# Patient Record
Sex: FEMALE | Race: AMERICAN INDIAN OR ALASKA NATIVE
[De-identification: names, ages, dates, MRNs, and addresses within clinical notes are randomized per-mention and may not be internally consistent; named-entity substitution may affect disease eponyms.]

---

## 2022-01-04 ENCOUNTER — HOSPITAL ENCOUNTER (EMERGENCY)
Dept: HOSPITAL 5 - ED | Age: 62
Discharge: HOME | End: 2022-01-04
Payer: SELF-PAY

## 2022-01-04 VITALS — DIASTOLIC BLOOD PRESSURE: 79 MMHG | SYSTOLIC BLOOD PRESSURE: 133 MMHG

## 2022-01-04 DIAGNOSIS — E11.8: ICD-10-CM

## 2022-01-04 DIAGNOSIS — R19.7: ICD-10-CM

## 2022-01-04 DIAGNOSIS — Z91.041: ICD-10-CM

## 2022-01-04 DIAGNOSIS — I10: ICD-10-CM

## 2022-01-04 DIAGNOSIS — U07.1: Primary | ICD-10-CM

## 2022-01-04 DIAGNOSIS — Z88.6: ICD-10-CM

## 2022-01-04 DIAGNOSIS — J45.909: ICD-10-CM

## 2022-01-04 DIAGNOSIS — R11.2: ICD-10-CM

## 2022-01-04 LAB
ALBUMIN SERPL-MCNC: 3.9 G/DL (ref 3.9–5)
ALT SERPL-CCNC: 13 UNITS/L (ref 7–56)
BACTERIA #/AREA URNS HPF: (no result) /HPF
BASOPHILS # (AUTO): 0.1 K/MM3 (ref 0–0.1)
BASOPHILS NFR BLD AUTO: 0.7 % (ref 0–1.8)
BILIRUB UR QL STRIP: (no result)
BLOOD UR QL VISUAL: (no result)
BUN SERPL-MCNC: 34 MG/DL (ref 7–17)
BUN/CREAT SERPL: 20 %
CALCIUM SERPL-MCNC: 9.5 MG/DL (ref 8.4–10.2)
EOSINOPHIL # BLD AUTO: 0 K/MM3 (ref 0–0.4)
EOSINOPHIL NFR BLD AUTO: 0.2 % (ref 0–4.3)
HCT VFR BLD CALC: 49.8 % (ref 30.3–42.9)
HEMOLYSIS INDEX: 11
HGB BLD-MCNC: 15.9 GM/DL (ref 10.1–14.3)
LYMPHOCYTES # BLD AUTO: 2.3 K/MM3 (ref 1.2–5.4)
LYMPHOCYTES NFR BLD AUTO: 27.8 % (ref 13.4–35)
MCHC RBC AUTO-ENTMCNC: 32 % (ref 30–34)
MCV RBC AUTO: 83 FL (ref 79–97)
MONOCYTES # (AUTO): 0.5 K/MM3 (ref 0–0.8)
MONOCYTES % (AUTO): 6.4 % (ref 0–7.3)
MUCOUS THREADS #/AREA URNS HPF: (no result) /HPF
PH UR STRIP: 5 [PH] (ref 5–7)
PLATELET # BLD: 255 K/MM3 (ref 140–440)
PROT UR STRIP-MCNC: >500 MG/DL
RBC # BLD AUTO: 6.01 M/MM3 (ref 3.65–5.03)
RBC #/AREA URNS HPF: 1 /HPF (ref 0–6)
UROBILINOGEN UR-MCNC: < 2 MG/DL (ref ?–2)
WBC #/AREA URNS HPF: 1 /HPF (ref 0–6)

## 2022-01-04 PROCEDURE — 96374 THER/PROPH/DIAG INJ IV PUSH: CPT

## 2022-01-04 PROCEDURE — 81001 URINALYSIS AUTO W/SCOPE: CPT

## 2022-01-04 PROCEDURE — 85379 FIBRIN DEGRADATION QUANT: CPT

## 2022-01-04 PROCEDURE — 99284 EMERGENCY DEPT VISIT MOD MDM: CPT

## 2022-01-04 PROCEDURE — 85025 COMPLETE CBC W/AUTO DIFF WBC: CPT

## 2022-01-04 PROCEDURE — 71046 X-RAY EXAM CHEST 2 VIEWS: CPT

## 2022-01-04 PROCEDURE — 96361 HYDRATE IV INFUSION ADD-ON: CPT

## 2022-01-04 PROCEDURE — 36415 COLL VENOUS BLD VENIPUNCTURE: CPT

## 2022-01-04 PROCEDURE — 80053 COMPREHEN METABOLIC PANEL: CPT

## 2022-01-04 NOTE — EMERGENCY DEPARTMENT REPORT
ED General Adult HPI





- General


Chief complaint: Abdominal Pain


Stated complaint: POSITIVE FOR COVID


Time Seen by Provider: 22 10:13


Source: EMS


Mode of arrival: Wheelchair


Limitations: No Limitations





- History of Present Illness


Initial comments: 





61-year-old -American female who tested positive for Covid yesterday 

presents to the emergency room complaining of abdominal pain x1 day nausea and 

vomiting x1 day.  She denies any diarrhea.  She states she was at the hospital 

when she tested positive but does not know the hospital's name.  She reports 

that she has a history of diabetes hypertension kidney disease and asthma but 

does not know her primary care doctor's name.  Patient reports she did not get 

any of her prescriptions filled as she cannot walk.  Patient came in by EMS.


Onset/Timin


-: days(s)


Location: abdomen


Severity scale (0 -10): 5


Improves with: none


Worsens with: none


Associated Symptoms: fever/chills, loss of appetite, nausea/vomiting, weakness


Treatments Prior to Arrival: none





- Related Data


                                  Previous Rx's











 Medication  Instructions  Recorded  Last Taken  Type


 


amLODIPine [Norvasc] 5 mg PO DAILY #30 tab 02/26/15 07/08/15 09:00 Rx





   5 mg 


 


Omeprazole [PriLOSEC] 20 mg PO QDAY #30 capsule. 04/05/15 07/08/15 09:00 Rx


 


metFORMIN [Glucophage] 500 mg PO BID #60 tablet 07/08/15 Unknown Rx











                                    Allergies











Allergy/AdvReac Type Severity Reaction Status Date / Time


 


aspirin AdvReac  Nausea Verified 07/08/15 11:39


 


iodine AdvReac  GRANADOS SKIN Verified 07/08/15 11:39














ED Review of Systems


ROS: 


Stated complaint: POSITIVE FOR COVID


Other details as noted in HPI





Comment: All other systems reviewed and negative





ED Past Medical Hx





- Past Medical History


Hx Hypertension: Yes


Hx Diabetes: Yes


Hx GERD: Yes


Hx Arthritis: Yes


Hx Psychiatric Treatment: Yes (ANXIETY)


Hx Asthma: Yes





- Surgical History


Additional Surgical History: TUBAL LIGATION





- Social History


Smoking Status: Never Smoker


Substance Use Type: None





- Medications


Home Medications: 


                                Home Medications











 Medication  Instructions  Recorded  Confirmed  Last Taken  Type


 


amLODIPine [Norvasc] 5 mg PO DAILY #30 tab 02/26/15 07/08/15 07/08/15 09:00 Rx





    5 mg 


 


Omeprazole [PriLOSEC] 20 mg PO QDAY #30 capsule. 04/05/15 07/08/15 07/08/15 

09:00 Rx


 


metFORMIN [Glucophage] 500 mg PO BID #60 tablet 07/08/15  Unknown Rx














ED Physical Exam





- General


Limitations: No Limitations


General appearance: alert, in no apparent distress





- Head


Head exam: Present: atraumatic, normocephalic





- Eye


Eye exam: Present: normal appearance





- ENT


ENT exam: Present: mucous membranes moist, normal external ear exam





- Neck


Neck exam: Present: normal inspection, full ROM





ED Course





                                   Vital Signs











  22





  09:24


 


Temperature 99.1 F


 


Pulse Rate 113 H


 


Respiratory 17





Rate 


 


Blood Pressure 117/81


 


O2 Sat by Pulse 96





Oximetry 











Critical care attestation.: 


If time is entered above; I have spent that time in minutes in the direct care 

of this critically ill patient, excluding procedure time.








ED Disposition


Condition: Stable


Instructions:  Abdominal Pain (ED)


Referrals: 


PRIMARY CARE,MD [Primary Care Provider] - 3-5 Days

## 2022-01-04 NOTE — EVENT NOTE
ED Screening Note


ED Screening Note: 


61-year-old -American female who tested positive for Covid yesterday 

presents to the emergency room complaining of abdominal pain x1 day nausea and 

vomiting x1 day.  She denies any diarrhea.  She states she was at the hospital 

when she tested positive but does not know the hospital's name.  She reports 

that she has a history of diabetes hypertension kidney disease and asthma but 

does not know her primary care doctor's name.  Patient reports she did not get 

any of her prescriptions filled as she cannot walk.  Patient came in by EMS.





Patient has pale conjunctiva. Actively vomiting. Tachycardic








This initial assessment/diagnostic orders/clinical plan/treatment(s) is/are 

subject to change based on patients health status, clinical progression and re-

assessment by fellow clinical providers in the ED. Further treatment and workup 

at subsequent clinical providers discretion. Patient/guardian urged not to elope

from the ED as their condition may be serious if not clinically assessed and 

managed. 





Initial orders include:

## 2022-01-04 NOTE — XRAY REPORT
CHEST PA AND LATERAL VIEWS



INDICATION: 

sob,cough and rales.



COMPARISON: 

None.



FINDINGS:



Support devices: None.

Heart: Within normal limits. 

Lungs/Pleura: No acute pulmonary or pleural findings.  







IMPRESSION:

1. No acute findings.



Signer Name: Sebas Taylor MD 

Signed: 1/4/2022 10:35 AM

Workstation Name: [id number]

## 2022-01-04 NOTE — EMERGENCY DEPARTMENT REPORT
ED General Adult HPI





- General


Chief complaint: Abdominal Pain


Stated complaint: POSITIVE FOR COVID


Time Seen by Provider: 01/04/22 10:13


Source: EMS


Mode of arrival: Wheelchair


Limitations: No Limitations





- History of Present Illness


Initial comments: 





Patient has not been feeling well.  She was diagnosed with coronavirus yesterday

although she does not know which hospital.  She has had cough and congestion.  

She has had vomiting.  She was sick before yesterday.  She states that she 

really does not know how long she has been ill.  There has been no hematemesis 

or coffee-ground emesis.  She has no melenic stool.  Patient has no hematuria.  

She has never been told that she is anemic before.  She has never required a 

transfusion.  Regardless, today she was not feeling well.  She came here for 

evaluation.  There is no history of recent travel or trauma.





- Related Data


                                  Previous Rx's











 Medication  Instructions  Recorded  Last Taken  Type


 


amLODIPine [Norvasc] 5 mg PO DAILY #30 tab 02/26/15 07/08/15 09:00 Rx





   5 mg 


 


Omeprazole [PriLOSEC] 20 mg PO QDAY #30 capsule. 04/05/15 07/08/15 09:00 Rx


 


metFORMIN [Glucophage] 500 mg PO BID #60 tablet 07/08/15 Unknown Rx


 


Hyoscyamine Subl [Levsin Sl 0.125 0.125 mg SL Q6HR PRN #20 tab 01/04/22 Unknown 

Rx





TAB]    


 


Ondansetron [Zofran ODT TAB] 8 mg PO Q8HR PRN #20 tab.rapdis 01/04/22 Unknown Rx











                                    Allergies











Allergy/AdvReac Type Severity Reaction Status Date / Time


 


aspirin AdvReac  Nausea Verified 07/08/15 11:39


 


iodine AdvReac  GRANADOS SKIN Verified 07/08/15 11:39














ED Review of Systems


ROS: 


Stated complaint: POSITIVE FOR COVID


Other details as noted in HPI





Comment: All other systems reviewed and negative


Constitutional: chills, fever


Eyes: denies: vision change


ENT: denies: epistaxis


Respiratory: see HPI


Cardiovascular: denies: chest pain


Endocrine: denies: unexplained weight loss


Gastrointestinal: as per HPI, vomiting, diarrhea.  denies: hematemesis, melena


Genitourinary: denies: hematuria


Musculoskeletal: denies: back pain


Skin: denies: rash


Neurological: denies: headache


Hematological/Lymphatic: denies: easy bruising





ED Past Medical Hx





- Past Medical History


Hx Hypertension: Yes


Hx Diabetes: Yes


Hx GERD: Yes


Hx Arthritis: Yes


Hx Psychiatric Treatment: Yes (ANXIETY)


Hx Asthma: Yes





- Surgical History


Additional Surgical History: TUBAL LIGATION





- Family History


Family history: hypertension





- Social History


Smoking Status: Never Smoker


Substance Use Type: None





- Medications


Home Medications: 


                                Home Medications











 Medication  Instructions  Recorded  Confirmed  Last Taken  Type


 


amLODIPine [Norvasc] 5 mg PO DAILY #30 tab 02/26/15 07/08/15 07/08/15 09:00 Rx





    5 mg 


 


Omeprazole [PriLOSEC] 20 mg PO QDAY #30 capsule.dr 04/05/15 07/08/15 07/08/15 

09:00 Rx


 


metFORMIN [Glucophage] 500 mg PO BID #60 tablet 07/08/15  Unknown Rx


 


Hyoscyamine Subl [Levsin Sl 0.125 0.125 mg SL Q6HR PRN #20 tab 01/04/22  Unknown

 Rx





TAB]     


 


Ondansetron [Zofran ODT TAB] 8 mg PO Q8HR PRN #20 tab.rapdis 01/04/22  Unknown 

Rx














ED Physical Exam





- General


Limitations: No Limitations, Other (Pulse ox noted and normal)


General appearance: alert, in no apparent distress, other (Frail)





- Head


Head exam: Present: atraumatic, normocephalic





- Eye


Eye exam: Present: normal appearance, EOMI, other (Conjunctival pallor)





- ENT


ENT exam: Present: mucous membranes dry, normal external ear exam





- Neck


Neck exam: Present: normal inspection.  Absent: meningismus





- Respiratory


Respiratory exam: Present: normal lung sounds bilaterally.  Absent: respiratory 

distress





- Cardiovascular


Cardiovascular Exam: Present: normal rhythm, tachycardia





- GI/Abdominal


GI/Abdominal exam: Present: soft.  Absent: tenderness, guarding





- Extremities Exam


Extremities exam: Present: normal capillary refill





- Back Exam


Back exam: Absent: CVA tenderness (R), CVA tenderness (L)





- Neurological Exam


Neurological exam: Present: alert, oriented X3, CN II-XII intact.  Absent: motor

sensory deficit





- Psychiatric


Psychiatric exam: Present: normal affect, normal mood





- Skin


Skin exam: Present: warm, dry





ED Course


                                   Vital Signs











  01/04/22





  09:24


 


Temperature 99.1 F


 


Pulse Rate 113 H


 


Respiratory 17





Rate 


 


Blood Pressure 117/81


 


O2 Sat by Pulse 96





Oximetry 














- Reevaluation(s)


Reevaluation #1: 





01/04/22 11:41


Labs have been noted.  Old records reviewed.  Repeat H&H has been ordered.


Reevaluation #2: 





01/04/22 11:47


Labs were reviewed.  Patient actually has a normal hemoglobin and hematocrit.  

Her red cells were 6.  She is not anemic.  She does not require further 

management of that.  We will continue to address her Covid symptoms.


Reevaluation #3: 





01/04/22 19:01


Labs of been complete.  Patient has no further vomiting.  She does not appear to

be septic or toxic.  Is not hypoxic.  There is no indication for admission.  I 

have informed her of this.  She does not have acute hepatitis or pancreatitis.  

She was subsequently discharged.





ED Medical Decision Making





- Lab Data


Result diagrams: 


                                 01/04/22 10:16





                                 01/04/22 11:44





- Medical Decision Making





Patient was diagnosed with coronavirus yesterday.  She present with abdominal 

pain and vomiting.  She is no vomiting here.  She does not appear to be septic 

or toxic.  There is no abdominal distention to suggest bowel obstruction.  She 

does not have peritoneal finding.  Patient does not have evidence of acute 

pancreatitis or hepatitis.  She does not appear to be septic or toxic.  She does

not have intractable pain.  There is no medical indication for admission at this

time.  She was treated symptomatically and referred for outpatient evaluation 

and follow-up.


Critical care attestation.: 


If time is entered above; I have spent that time in minutes in the direct care 

of this critically ill patient, excluding procedure time.








ED Disposition


Clinical Impression: 


 COVID-19 virus infection, Nausea vomiting and diarrhea





Disposition: 01 HOME / SELF CARE / HOMELESS


Is pt being admited?: No


Condition: Stable


Instructions:  Diarrhea, Adult, COVID-19, Nausea and Vomiting, Adult, Prevent 

the Spread of COVID-19 if You Are Sick - CDC, Probiotics, Abdominal Pain (ED)


Additional Instructions: 


Have a bland diet.  Drink plenty water.  Return for problems.  Isolate at home. 

Follow-up with your regular doctor for recheck.


Prescriptions: 


Hyoscyamine Subl [Levsin Sl 0.125 TAB] 0.125 mg SL Q6HR PRN #20 tab


 PRN Reason: cramps


Ondansetron [Zofran ODT TAB] 8 mg PO Q8HR PRN #20 tab.rapdis


 PRN Reason: Nausea


Referrals: 


PRIMARY CARE,MD [Primary Care Provider] - 3-5 Days


NAFISA SHARP MD [Staff Physician] - 3-5 Days